# Patient Record
Sex: FEMALE | URBAN - METROPOLITAN AREA
[De-identification: names, ages, dates, MRNs, and addresses within clinical notes are randomized per-mention and may not be internally consistent; named-entity substitution may affect disease eponyms.]

---

## 2023-01-20 ENCOUNTER — OFFICE VISIT (OUTPATIENT)
Dept: URBAN - METROPOLITAN AREA CLINIC 89 | Facility: CLINIC | Age: 63
End: 2023-01-20
Payer: MEDICAID

## 2023-01-20 DIAGNOSIS — H25.812 COMBINED FORMS OF AGE-RELATED CATARACT, LEFT EYE: Primary | ICD-10-CM

## 2023-01-20 DIAGNOSIS — H25.11 AGE-RELATED NUCLEAR CATARACT, RIGHT EYE: ICD-10-CM

## 2023-01-20 PROCEDURE — 92015 DETERMINE REFRACTIVE STATE: CPT | Performed by: OPTOMETRIST

## 2023-01-20 PROCEDURE — 92134 CPTRZ OPH DX IMG PST SGM RTA: CPT | Performed by: OPTOMETRIST

## 2023-01-20 PROCEDURE — 99204 OFFICE O/P NEW MOD 45 MIN: CPT | Performed by: OPTOMETRIST

## 2023-01-20 ASSESSMENT — INTRAOCULAR PRESSURE
OS: 13
OD: 13

## 2023-01-20 NOTE — IMPRESSION/PLAN
Impression: Combined forms of age-related cataract, left eye: H25.812. Plan: Patient has complaints consistent with visually significant cataracts. Will proceed with cataract surgery left eye first.  Discussed the risks, benefits, and alternatives of cataract surgery. Given that we almost never use retrobulbar anesthesia, there is typically no need to stop any anticoagulant medications when a patient gets cataract surgery with us. In most cataract surgeries performed by us we place an antibiotic and steroid at the time of surgery so most likely will not need to use any prescription post-op drops. The patient stated a full understanding and a desire to proceed with the procedure. Biometry ordered for intraocular lens selection. Advised against driving until complete. Reviewed the increased risk of retinal detachment after cataract surgery and reviewed retinal detachment and general warnings and to contact us immediately should any of those develop. CE OU, OS First.  MOCT, glare testing and refraction performed Discussed with patient that we will be doing postoperative care on day 1 and day 8, and they will be seeing Dr Lisseth Barry one month after surgery for refraction and postoperative dilation.

## 2023-04-04 ENCOUNTER — SURGERY (OUTPATIENT)
Dept: URBAN - METROPOLITAN AREA SURGERY 56 | Facility: SURGERY | Age: 63
End: 2023-04-04
Payer: MEDICAID

## 2023-04-05 ENCOUNTER — POST-OPERATIVE VISIT (OUTPATIENT)
Dept: URBAN - METROPOLITAN AREA CLINIC 89 | Facility: CLINIC | Age: 63
End: 2023-04-05
Payer: MEDICAID

## 2023-04-05 DIAGNOSIS — Z48.810 ENCOUNTER FOR SURGICAL AFTERCARE FOLLOWING SURGERY ON THE SENSE ORGANS: Primary | ICD-10-CM

## 2023-04-05 PROCEDURE — 99024 POSTOP FOLLOW-UP VISIT: CPT | Performed by: OPTOMETRIST

## 2023-04-05 RX ORDER — PREDNISOLONE ACETATE 10 MG/ML
1 % SUSPENSION/ DROPS OPHTHALMIC
Qty: 10 | Refills: 3 | Status: ACTIVE
Start: 2023-04-05

## 2023-04-05 ASSESSMENT — INTRAOCULAR PRESSURE: OS: 9

## 2023-04-12 ENCOUNTER — POST-OPERATIVE VISIT (OUTPATIENT)
Dept: URBAN - METROPOLITAN AREA CLINIC 89 | Facility: CLINIC | Age: 63
End: 2023-04-12
Payer: MEDICAID

## 2023-04-12 DIAGNOSIS — Z96.1 PRESENCE OF INTRAOCULAR LENS: Primary | ICD-10-CM

## 2023-04-12 PROCEDURE — 99024 POSTOP FOLLOW-UP VISIT: CPT | Performed by: OPTOMETRIST

## 2023-04-12 ASSESSMENT — INTRAOCULAR PRESSURE
OS: 8
OD: 9

## 2023-04-12 NOTE — IMPRESSION/PLAN
Impression: S/P CE/Standard IOL OD - 1 Day. Presence of intraocular lens  Z96.1. Plan: S/P Cataract extraction right eye with moxifloxacin intracamerally - post-op day #1 - Advised patient that likely will see floaters for 1-2 weeks. Advised no heavy lifting or strenuous activity for at least one week and no swimming for at least three weeks. Use eye shield at night for one week. Patient advised to call immediately for any problems including, but not limited to, pain, redness, increase in floaters, flashing lights, changes in peripheral vision, decreased vision, and/or any other problems or concerns. Patient stated an understanding of all the instructions. Start prednisolone acetate 1% QID OD x 1 week.   RTC 1 week

## 2023-04-19 ENCOUNTER — POST-OPERATIVE VISIT (OUTPATIENT)
Dept: URBAN - METROPOLITAN AREA CLINIC 89 | Facility: CLINIC | Age: 63
End: 2023-04-19
Payer: MEDICAID

## 2023-04-19 DIAGNOSIS — Z96.1 PRESENCE OF INTRAOCULAR LENS: Primary | ICD-10-CM

## 2023-04-19 PROCEDURE — 99024 POSTOP FOLLOW-UP VISIT: CPT | Performed by: OPTOMETRIST

## 2023-04-19 ASSESSMENT — INTRAOCULAR PRESSURE
OS: 9
OD: 8

## 2023-04-19 NOTE — IMPRESSION/PLAN
Impression: S/P CE/Standard IOL OD - 8 Days. Presence of intraocular lens  Z96.1. Plan: S/P Cataract extraction right eye with moxifloxacin intracamerally - post-op day #7 - Advised patient that likely will see floaters for 1-2 weeks. Advised no heavy lifting or strenuous activity for at least one week and no swimming for at least three weeks. Use eye shield at night for one week. Patient advised to call immediately for any problems including, but not limited to, pain, redness, increase in floaters, flashing lights, changes in peripheral vision, decreased vision, and/or any other problems or concerns. Patient stated an understanding of all the instructions. Start steroid taper TID x 1week, BID x 1wk, QD x 1wk.   

Patient is released to Dr Tho Smith for Postoperative care at this time and is advised to schedule an appointment with him in one month for dilation